# Patient Record
Sex: FEMALE | Race: WHITE | NOT HISPANIC OR LATINO | Employment: FULL TIME | URBAN - METROPOLITAN AREA
[De-identification: names, ages, dates, MRNs, and addresses within clinical notes are randomized per-mention and may not be internally consistent; named-entity substitution may affect disease eponyms.]

---

## 2023-02-27 ENCOUNTER — APPOINTMENT (OUTPATIENT)
Dept: CT IMAGING | Facility: CLINIC | Age: 73
End: 2023-02-27
Attending: PHYSICIAN ASSISTANT
Payer: MEDICARE

## 2023-02-27 ENCOUNTER — HOSPITAL ENCOUNTER (EMERGENCY)
Facility: CLINIC | Age: 73
Discharge: HOME | End: 2023-02-27
Admitting: PHYSICIAN ASSISTANT
Payer: MEDICARE

## 2023-02-27 VITALS
RESPIRATION RATE: 16 BRPM | SYSTOLIC BLOOD PRESSURE: 161 MMHG | TEMPERATURE: 97.9 F | OXYGEN SATURATION: 98 % | HEART RATE: 74 BPM | DIASTOLIC BLOOD PRESSURE: 78 MMHG

## 2023-02-27 DIAGNOSIS — S16.1XXA STRAIN OF NECK MUSCLE, INITIAL ENCOUNTER: ICD-10-CM
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
## 2023-02-27 DIAGNOSIS — S00.03XA CONTUSION OF SCALP, INITIAL ENCOUNTER: ICD-10-CM

## 2023-02-27 PROCEDURE — 99284 EMERGENCY DEPT VISIT MOD MDM: CPT | Mod: 25

## 2023-02-27 PROCEDURE — 70450 CT HEAD/BRAIN W/O DYE: CPT | Mod: 26 | Performed by: RADIOLOGY

## 2023-02-27 PROCEDURE — 99284 EMERGENCY DEPT VISIT MOD MDM: CPT | Performed by: PHYSICIAN ASSISTANT

## 2023-02-27 PROCEDURE — 72125 CT NECK SPINE W/O DYE: CPT | Mod: 26 | Performed by: RADIOLOGY

## 2023-02-27 PROCEDURE — 72125 CT NECK SPINE W/O DYE: CPT

## 2023-02-27 PROCEDURE — 70450 CT HEAD/BRAIN W/O DYE: CPT

## 2023-02-27 ASSESSMENT — ACTIVITIES OF DAILY LIVING (ADL): ADLS_ACUITY_SCORE: 33

## 2023-02-28 NOTE — ED PROVIDER NOTES
ED Provider Note  [facility]      History     Chief Complaint   Patient presents with     Fall     Head Injury     HPI  Beth Da Silva is a 72 year old female who presents to the emergency department tonight with concerns for head pain and neck pain after fall.  Patient presents alone.  She states that around 6 PM yesterday, approximately 13 hours ago, she was walking on the ice, slipped, and fell directly onto the back of her head.  Patient states that she was able to get up immediately, and was not on the ground for a prolonged period of time.  She sustained an abrasion to the occiput.  She tells me that she today is experiencing a mild occipital headache without any associated dizziness lightheadedness vomiting changes in vision.  Patient states when she hit her head she did not lose consciousness.  She is not anticoagulated.  She does report some neck pain as well from the fall localized to the paraspinal muscles in the cervical region without any midline tenderness.  Patient denies any associated chest pain, shortness of breath, back pain abdominal pain any pain to her upper or lower extremities.  She has been taking ibuprofen so far for symptom relief.  Patient is requesting CT scan of her head.    Past Medical History  No past medical history on file.  No past surgical history on file.  No current outpatient medications on file.    Not on File  Family History  No family history on file.  Social History          A medically appropriate review of systems was performed with pertinent positives and negatives noted in the HPI, and all other systems negative.    Physical Exam   BP: (!) 161/78  Pulse: 74  Temp: 97.9  F (36.6  C)  Resp: 16  SpO2: 98 %  Physical Exam    GENERAL APPEARANCE: The patient is well developed, well appearing, and in no acute distress.  HEAD:  Normocephalic and atraumatic.  Examination of the scalp shows small hematoma to the occiput, with associated abrasion.   There is some visible eschar over the wound, without laceration, current open skin, erythema, or other findings to suggest infection.  EENT: Voice normal.  No hemotympanums bilaterally.  NECK: Trachea midline.  Patient has reproducible tenderness to palpation in the paraspinal muscles of the cervical spine without any midline tenderness.  She is able to flex and extend the neck, which does reproduce some of her symptoms, she is able to laterally bend to each shoulder with some slight reproduction of her symptoms as well.  She otherwise has no tenderness to palpation at midline and cervical thoracic and lumbar spine.  LUNGS: Breath sounds are equal and clear bilaterally. No wheezes, rhonchi, or rales.  HEART: Regular rate and normal rhythm.  No tenderness to palpation along the chest wall bilaterally.  ABDOMEN: Soft, flat, and benign. No mass, tenderness, guarding, or rebound.Bowel sounds are present.  EXTREMITIES: No cyanosis, clubbing, or edema.  NEUROLOGIC: No focal sensory or motor deficits are noted.  Cranial nerves II through XII intact.  Rapid alternating movements, finger-to-nose testing intact.   strength 5 out of 5 bilaterally.  Resisted plantarflexion dorsiflexion 5 out of 5 bilaterally.  No pronator drift on exam.  Patient has baseline resting tremor, per patient normal.  PSYCHIATRIC: The patient is awake, alert.  Appropriate mood and affect.  SKIN: Warm, dry, and well perfused. Good turgor.        ED Course, Procedures, & Data           Results for orders placed or performed during the hospital encounter of 02/27/23   Head CT w/o contrast     Status: None    Narrative    EXAM: Head CT without contrast 2/27/2023 7:48 PM    HISTORY: Fall from standing, occipital hematoma, headache.    COMPARISON: None.    TECHNIQUE: Using multidetector thin collimation helical acquisition  technique, axial, coronal and sagittal CT images from the skull base  to the vertex were obtained without intravenous contrast.    (topogram) image(s) also obtained and reviewed.    FINDINGS: There is no intracranial hemorrhage, mass effect, or midline  shift. Gray/white matter differentiation in both cerebral hemispheres  is preserved. Ventricles are proportionate to the cerebral sulci. The  basal cisterns are clear. Confluent periventricular and subcortical  white matter hypodensities, likely representing cerebral  microangiopathy given the patient's age. Mild generalized cerebral  parenchymal volume loss, in keeping with the patient's age.    The bony calvaria and the bones of the skull base are normal. The  orbits appear unremarkable. Visualized portions of the paranasal  sinuses are clear. Mastoid air cells are clear. Small high occipital  scalp soft tissue swelling/hematoma.      Impression    IMPRESSION: No acute intracranial pathology.    I have personally reviewed the examination and initial interpretation  and I agree with the findings.    JAVAN ROTHMAN MD         SYSTEM ID:  [id number]   Cervical spine CT w/o contrast     Status: None    Narrative    EXAM: Cervical spine CT without contrast 2/27/2023 7:48 PM     HISTORY: Fall from standing, neck pain.    COMPARISON: None.    TECHNIQUE: Using multidetector thin collimation helical acquisition  technique, axial, coronal and sagittal CT images through the cervical  spine were obtained without intravenous contrast.    FINDINGS:  Normal vertebral body alignment. There is straightening of the normal  cervical lordosis. No acute fracture or traumatic subluxation. No loss  of intervertebral disc height. No prevertebral edema.    The findings on a level by level basis are as follows:    C2-3: No spinal canal or neural foraminal stenosis.    C3-4: No spinal canal or neural foraminal stenosis.    C4-5: Posterior disc osteophyte complex. Minimal uncovertebral  spurring and facet arthropathy. No spinal canal or neural foraminal  stenosis.    C5-6: Posterior disc osteophyte complex. Mild  spinal canal stenosis.  Uncovertebral spurring and facet arthropathy causing mild bilateral  neuroforaminal stenosis.    C6-7: Large right paracentral posterior disc osteophyte complex  causing mild spinal canal stenosis. Left greater than right  uncovertebral spurring and facet arthropathy causing moderate left and  mild right neuroforaminal stenosis.    C7-T1: No spinal canal or neural foraminal stenosis..     Probable osteopenia. No abnormality of the paraspinous soft tissues.      Impression    IMPRESSION:  1. No acute fracture or traumatic subluxation.  2. Multilevel cervical spondylosis as described above, greatest at  C6-7.    I have personally reviewed the examination and initial interpretation  and I agree with the findings.    JAVAN ROTHMAN MD         SYSTEM ID:  [id number]     Medications - No data to display  Labs Ordered and Resulted from Time of ED Arrival to Time of ED Departure - No data to display  Cervical spine CT w/o contrast   Final Result   IMPRESSION:   1. No acute fracture or traumatic subluxation.   2. Multilevel cervical spondylosis as described above, greatest at   C6-7.      I have personally reviewed the examination and initial interpretation   and I agree with the findings.      JAVAN ROTHMAN MD            SYSTEM ID:  [id number]      Head CT w/o contrast   Final Result   IMPRESSION: No acute intracranial pathology.      I have personally reviewed the examination and initial interpretation   and I agree with the findings.      JAVAN ROTHMAN MD            SYSTEM ID:  [id number]             Critical care was not performed.     Medical Decision Making  The patient's presentation was of moderate complexity (an acute complicated injury).    The patient's evaluation involved:  ordering and/or review of 2 test(s) in this encounter (see separate area of note for details)    The patient's management necessitated only low risk treatment.      Assessment & Plan    This is a 72-year-old  female presented emergency department after sustaining mechanical fall yesterday and hitting her head, presenting today with mild occipital headache, contusion, and neck pain localized to her paraspinal muscles without any midline tenderness.  On presentation vital signs within normal limits.  Physical exam shows no focal neurologic deficits, no midline neck or back pain, visible hematoma to the occiput without laceration and superficial abrasion present.  Discussed with patient that with her age, mechanism of injury, current symptoms, it certainly would not be unreasonable to obtain neuroimaging.  CT head noncontrast along with CT C-spine were ordered.  These returned with the CT showing no findings to suggest head bleed or skull fracture.  C-spine images returned without fracture or subluxation.  This was communicated to the patient.  I discussed with her recommendations of Tylenol for pain, ice, and recommendations of following up with her primary care provider.  At this time patient is safe to discharge, we discussed returning if she does develop any new or worsening symptoms.  Patient has no other questions or concerns at this time.  Red flag signs were addressed, and they were in agreement with the patient care plan provided.    I have reviewed the nursing notes. I have reviewed the findings, diagnosis, plan and need for follow up with the patient.    There are no discharge medications for this patient.      Final diagnoses:   Fall due to slipping on ice or snow, initial encounter   Contusion of scalp, initial encounter   Strain of neck muscle, initial encounter       RBYAN Franklin  [facility] EMERGENCY DEPARTMENT  2/27/2023

## 2023-02-28 NOTE — DISCHARGE INSTRUCTIONS
Here in the emergency department, your CT scans returned without findings to suggest brain bleed or skull fracture.  Your neck CT shows no neck fracture or dislocation, and does show findings of arthritis which can be expected as we get older.  We discussed at this point in time it is reasonable for you to discharge home, you have findings consistent with a muscle strain in the neck, along with a bruise to the scalp.  We discussed heat, ice, Tylenol 1000 mg 3 times daily for pain.  I do recommend following up with her primary care provider.  We discussed return to the emergency department if you develop any new or worsening symptoms including severe headache vomiting changes in vision.    If you develop any new or worsening symptoms, is important to return right away to the emergency department for further evaluation and management.

## 2023-02-28 NOTE — ED TRIAGE NOTES
Pt presented to ED with c/o head and neck pain s/p fall yesterday at 1800. No thinners. Requesting CT scan of head.      Triage Assessment     Row Name 02/27/23 [id number]       Triage Assessment (Adult)    Airway WDL WDL       Respiratory WDL    Respiratory WDL WDL       Skin Circulation/Temperature WDL    Skin Circulation/Temperature WDL WDL       Cardiac WDL    Cardiac WDL WDL       Peripheral/Neurovascular WDL    Peripheral Neurovascular WDL WDL       Cognitive/Neuro/Behavioral WDL    Cognitive/Neuro/Behavioral WDL WDL

## 2023-05-21 ENCOUNTER — HEALTH MAINTENANCE LETTER (OUTPATIENT)
Age: 73
End: 2023-05-21

## 2023-10-22 ENCOUNTER — HEALTH MAINTENANCE LETTER (OUTPATIENT)
Age: 73
End: 2023-10-22

## 2024-12-15 ENCOUNTER — HEALTH MAINTENANCE LETTER (OUTPATIENT)
Age: 74
End: 2024-12-15